# Patient Record
Sex: FEMALE | Race: WHITE | NOT HISPANIC OR LATINO | Employment: OTHER | URBAN - METROPOLITAN AREA
[De-identification: names, ages, dates, MRNs, and addresses within clinical notes are randomized per-mention and may not be internally consistent; named-entity substitution may affect disease eponyms.]

---

## 2017-12-30 ENCOUNTER — HOSPITAL ENCOUNTER (EMERGENCY)
Facility: HOSPITAL | Age: 52
Discharge: HOME | End: 2017-12-30
Attending: EMERGENCY MEDICINE
Payer: MEDICARE

## 2017-12-30 VITALS
TEMPERATURE: 98 F | HEART RATE: 74 BPM | SYSTOLIC BLOOD PRESSURE: 148 MMHG | DIASTOLIC BLOOD PRESSURE: 73 MMHG | RESPIRATION RATE: 16 BRPM

## 2017-12-30 DIAGNOSIS — M54.41 ACUTE BILATERAL LOW BACK PAIN WITH RIGHT-SIDED SCIATICA: Primary | ICD-10-CM

## 2017-12-30 PROCEDURE — 99283 EMERGENCY DEPT VISIT LOW MDM: CPT | Mod: 25

## 2017-12-30 PROCEDURE — 63600175 PHARM REV CODE 636 W HCPCS: Performed by: EMERGENCY MEDICINE

## 2017-12-30 PROCEDURE — 96372 THER/PROPH/DIAG INJ SC/IM: CPT

## 2017-12-30 PROCEDURE — 25000003 PHARM REV CODE 250: Performed by: EMERGENCY MEDICINE

## 2017-12-30 RX ORDER — KETOROLAC TROMETHAMINE 30 MG/ML
60 INJECTION, SOLUTION INTRAMUSCULAR; INTRAVENOUS
Status: COMPLETED | OUTPATIENT
Start: 2017-12-30 | End: 2017-12-30

## 2017-12-30 RX ORDER — DEXAMETHASONE SODIUM PHOSPHATE 4 MG/ML
12 INJECTION, SOLUTION INTRA-ARTICULAR; INTRALESIONAL; INTRAMUSCULAR; INTRAVENOUS; SOFT TISSUE
Status: COMPLETED | OUTPATIENT
Start: 2017-12-30 | End: 2017-12-30

## 2017-12-30 RX ORDER — KETOROLAC TROMETHAMINE 10 MG/1
10 TABLET, FILM COATED ORAL EVERY 6 HOURS
Qty: 20 TABLET | Refills: 0 | Status: SHIPPED | OUTPATIENT
Start: 2017-12-30 | End: 2018-02-08

## 2017-12-30 RX ORDER — METHOCARBAMOL 500 MG/1
1000 TABLET, FILM COATED ORAL 3 TIMES DAILY
Qty: 30 TABLET | Refills: 0 | Status: SHIPPED | OUTPATIENT
Start: 2017-12-30 | End: 2018-01-04

## 2017-12-30 RX ORDER — METHOCARBAMOL 500 MG/1
1000 TABLET, FILM COATED ORAL
Status: COMPLETED | OUTPATIENT
Start: 2017-12-30 | End: 2017-12-30

## 2017-12-30 RX ADMIN — KETOROLAC TROMETHAMINE 60 MG: 30 INJECTION, SOLUTION INTRAMUSCULAR at 04:12

## 2017-12-30 RX ADMIN — DEXAMETHASONE SODIUM PHOSPHATE 12 MG: 4 INJECTION, SOLUTION INTRAMUSCULAR; INTRAVENOUS at 04:12

## 2017-12-30 RX ADMIN — METHOCARBAMOL 1000 MG: 500 TABLET ORAL at 04:12

## 2017-12-30 NOTE — ED PROVIDER NOTES
[facility]                                                  Chief Complaint  52 y.o. female with Back Pain    History of Present Illness  Odilia Gonzalez presents to the emergency room with acute low back pain after picking up her grandchild.  She says she felt a pop.  She did not have any trauma such as a fall.  She denies neurologic symptoms.  She does report some pain into her upper right thigh.  Not had a history of chronic back pain.      No past medical history on file.  No past surgical history on file.   Review of patient's allergies indicates:  No Known Allergies     Review of Systems and Physical Exam     Review of Systems  -- Constitution - no fever, denies fatigue, no weakness, no chills  -- Eyes - no tearing or redness, no visual disturbance  -- Ear, Nose - no tinnitus or earache, no nasal congestion or discharge  -- Mouth,Throat - no sore throat, no toothache, normal voice, normal swallowing  -- Respiratory - denies cough and congestion, no shortness of breath, no wheezing  -- Cardiovascular - denies chest pain, no palpitations, denies claudication  -- Gastrointestinal - denies abdominal pain, nausea, vomiting, or diarrhea  -- Genitourinary - no dysuria, no denies flank pain, no hematuria or frequency   -- Musculoskeletal - reports low back pain which radiates to right thigh, negative for myalgias and arthralgias   -- Neurological - no headache, denies weakness or seizure; no LOC  -- Skin - denies pallor, rash, or changes in skin. no hives or welts noted    Vital Signs   oral temperature is 97.5 °F (36.4 °C). Her blood pressure is 148/73 (abnormal) and her pulse is 74. Her respiration is 16.      Physical Exam  -- Nursing note and vitals reviewed  -- Constitutional: Appears well-developed and well-nourished  -- Head: Atraumatic. Normocephalic. No obvious abnormality  -- Eyes: Pupils are equal and reactive to light. Normal conjunctiva and lids  -- Nose: Nose normal in appearance,  nares grossly normal. No discharge  -- Throat: Mucous membranes moist, pharynx normal, normal tonsils. No lesions   -- Ears: External ears and TM normal bilaterally. Normal hearing and no drainage  -- Neck: Normal range of motion. Neck supple. No masses, trachea midline  -- Cardiac: Normal rate, regular rhythm and normal heart sounds  -- Pulmonary: Normal respiratory effort, breath sounds clear to auscultation  -- Abdominal: Soft, no tenderness. Normal bowel sounds. Normal liver edge  -- Musculoskeletal: Normal range of motion, no effusions. Joints stable mild paraspinal lumbar pain with tenderness over the sciatic distribution.  Bilateral lower extremities neurovascularly intact  -- Neurological: No focal deficits. Showed good interaction with staff  -- Vascular: Posterior tibial, dorsalis pedis and radial pulses 2+ bilaterally    -- Lymphatics: No cervical or peripheral lymphadenopathy. No edema noted  -- Skin: Warm and dry. No evidence of rash or cellulitis  -- Psychiatric: Normal mood and affect. Bedside behavior is appropriate    Emergency Room Course     Treatment and Evaluation  1.  Physical exam unremarkable  2.  Lumbar spine x-ray negative for acute process  3.  Ketorolac 60 mg IM  4.  Decadron 12 IM  5.  Robaxin 1000 g by mouth  6.  DC home follow up PCP    Abnormal lab values  Labs Reviewed - No data to display    Medications Given  Medications   ketorolac injection 60 mg (60 mg Intramuscular Given 12/30/17 1602)   methocarbamol tablet 1,000 mg (1,000 mg Oral Given 12/30/17 1601)   dexamethasone injection 12 mg (12 mg Intramuscular Given 12/30/17 1603)           Diagnosis  -- Acute low back pain with sciatica    Disposition and Plan  -- Disposition: home  -- Condition: stable  -- Follow-up: Patient to follow up with Narinder Baird MD in 1-2 days.  -- I advised the patient that we have found no life threatening condition today  -- At this time, I believe the patient is clinically stable for discharge.    -- The patient acknowledges that close follow up with a MD is required   -- Patient agrees to comply with all instruction and direction given in the ER  -- Patient counseled on strict return precautions as discussed       Kelsey Boyd MD  12/30/17 1965

## 2017-12-30 NOTE — ED TRIAGE NOTES
Picked up grandchild on Thursday and felt pop in back and started hurting. Pain getting worse. Pain to mid and lower back on right side and rt. Leg.

## 2018-02-08 PROBLEM — E11.8 TYPE 2 DIABETES MELLITUS WITH COMPLICATION, WITHOUT LONG-TERM CURRENT USE OF INSULIN: Chronic | Status: ACTIVE | Noted: 2018-02-08

## 2018-02-08 PROBLEM — M19.90 ARTHRITIS: Status: ACTIVE | Noted: 2018-02-08

## 2018-02-08 PROBLEM — I25.10 CORONARY ARTERY DISEASE INVOLVING NATIVE CORONARY ARTERY OF NATIVE HEART WITHOUT ANGINA PECTORIS: Status: ACTIVE | Noted: 2018-02-08

## 2018-02-08 PROBLEM — F41.9 ANXIETY: Chronic | Status: ACTIVE | Noted: 2018-02-08

## 2018-02-08 PROBLEM — K21.9 GASTROESOPHAGEAL REFLUX DISEASE WITHOUT ESOPHAGITIS: Chronic | Status: ACTIVE | Noted: 2018-02-08

## 2018-02-08 PROBLEM — I10 ESSENTIAL HYPERTENSION: Chronic | Status: ACTIVE | Noted: 2018-02-08

## 2018-02-08 PROBLEM — J44.9 COPD (CHRONIC OBSTRUCTIVE PULMONARY DISEASE): Chronic | Status: ACTIVE | Noted: 2018-02-08

## 2018-02-08 PROBLEM — I10 ESSENTIAL HYPERTENSION: Status: ACTIVE | Noted: 2018-02-08

## 2018-02-08 PROBLEM — M51.16 LUMBAR DISC DISEASE WITH RADICULOPATHY: Status: ACTIVE | Noted: 2018-02-08

## 2018-02-08 PROBLEM — M51.16 LUMBAR DISC DISEASE WITH RADICULOPATHY: Chronic | Status: ACTIVE | Noted: 2018-02-08

## 2018-02-08 PROBLEM — J44.9 COPD (CHRONIC OBSTRUCTIVE PULMONARY DISEASE): Status: ACTIVE | Noted: 2018-02-08

## 2018-02-08 PROBLEM — F41.9 ANXIETY: Status: ACTIVE | Noted: 2018-02-08

## 2018-02-08 PROBLEM — E11.8 TYPE 2 DIABETES MELLITUS WITH COMPLICATION, WITHOUT LONG-TERM CURRENT USE OF INSULIN: Status: ACTIVE | Noted: 2018-02-08

## 2018-02-08 PROBLEM — I25.10 CORONARY ARTERY DISEASE INVOLVING NATIVE CORONARY ARTERY OF NATIVE HEART WITHOUT ANGINA PECTORIS: Chronic | Status: ACTIVE | Noted: 2018-02-08

## 2018-02-08 PROBLEM — M19.90 ARTHRITIS: Chronic | Status: ACTIVE | Noted: 2018-02-08

## 2018-02-08 PROBLEM — K21.9 GASTROESOPHAGEAL REFLUX DISEASE WITHOUT ESOPHAGITIS: Status: ACTIVE | Noted: 2018-02-08

## 2018-06-13 PROBLEM — I20.9 ANGINA, CLASS III: Status: ACTIVE | Noted: 2018-06-13

## 2018-08-02 PROBLEM — N18.30 CKD STAGE 3 SECONDARY TO DIABETES: Chronic | Status: ACTIVE | Noted: 2018-08-02

## 2018-08-02 PROBLEM — E11.22 CKD STAGE 3 SECONDARY TO DIABETES: Chronic | Status: ACTIVE | Noted: 2018-08-02

## 2018-08-30 PROBLEM — E03.4 HYPOTHYROIDISM DUE TO ACQUIRED ATROPHY OF THYROID: Chronic | Status: ACTIVE | Noted: 2018-08-30

## 2018-12-05 PROBLEM — M50.90 CERVICAL NECK PAIN WITH EVIDENCE OF DISC DISEASE: Chronic | Status: ACTIVE | Noted: 2018-12-05

## 2019-07-03 PROBLEM — E03.4 HYPOTHYROIDISM DUE TO ACQUIRED ATROPHY OF THYROID: Status: ACTIVE | Noted: 2018-08-30

## 2019-07-03 PROBLEM — R07.9 CHEST PAIN WITH HIGH RISK OF ACUTE CORONARY SYNDROME: Status: ACTIVE | Noted: 2019-07-03

## 2019-07-04 PROBLEM — R63.8 ALTERATION IN NUTRITION: Status: ACTIVE | Noted: 2019-07-04

## 2019-07-10 PROBLEM — K31.84 GASTROPARESIS DUE TO DM: Chronic | Status: ACTIVE | Noted: 2019-07-10

## 2019-07-10 PROBLEM — E11.43 GASTROPARESIS DUE TO DM: Chronic | Status: ACTIVE | Noted: 2019-07-10

## 2019-09-19 PROBLEM — J41.0 SIMPLE CHRONIC BRONCHITIS: Status: ACTIVE | Noted: 2018-02-08

## 2019-09-27 PROBLEM — J41.0 SIMPLE CHRONIC BRONCHITIS: Chronic | Status: ACTIVE | Noted: 2018-02-08

## 2019-10-03 PROBLEM — E66.01 CLASS 3 SEVERE OBESITY DUE TO EXCESS CALORIES WITH SERIOUS COMORBIDITY AND BODY MASS INDEX (BMI) OF 45.0 TO 49.9 IN ADULT: Chronic | Status: ACTIVE | Noted: 2019-10-03

## 2020-02-03 PROBLEM — R60.0 PEDAL EDEMA: Status: ACTIVE | Noted: 2020-02-03

## 2020-02-16 PROBLEM — R63.8 ALTERATION IN NUTRITION: Status: RESOLVED | Noted: 2019-07-04 | Resolved: 2020-02-16

## 2020-09-28 PROBLEM — E66.01 CLASS 3 SEVERE OBESITY DUE TO EXCESS CALORIES WITH SERIOUS COMORBIDITY AND BODY MASS INDEX (BMI) OF 50.0 TO 59.9 IN ADULT: Status: ACTIVE | Noted: 2019-10-03

## 2020-12-04 PROBLEM — G56.03 BILATERAL CARPAL TUNNEL SYNDROME: Status: ACTIVE | Noted: 2020-12-04

## 2021-02-24 PROBLEM — R19.4 CHANGE IN BOWEL HABIT: Status: ACTIVE | Noted: 2021-02-24

## 2021-03-26 PROBLEM — Z79.4 TYPE 2 DIABETES MELLITUS WITH DIABETIC POLYNEUROPATHY, WITH LONG-TERM CURRENT USE OF INSULIN: Status: ACTIVE | Noted: 2018-02-08

## 2021-03-26 PROBLEM — E78.5 HYPERLIPIDEMIA ASSOCIATED WITH TYPE 2 DIABETES MELLITUS: Status: ACTIVE | Noted: 2021-03-26

## 2021-03-26 PROBLEM — E11.42 TYPE 2 DIABETES MELLITUS WITH DIABETIC POLYNEUROPATHY, WITH LONG-TERM CURRENT USE OF INSULIN: Status: ACTIVE | Noted: 2018-02-08

## 2021-03-26 PROBLEM — E11.69 HYPERLIPIDEMIA ASSOCIATED WITH TYPE 2 DIABETES MELLITUS: Status: ACTIVE | Noted: 2021-03-26

## 2021-10-18 PROBLEM — Z71.3 DIETARY COUNSELING: Chronic | Status: ACTIVE | Noted: 2021-10-18

## 2021-10-18 PROBLEM — Z71.3 DIETARY COUNSELING: Status: ACTIVE | Noted: 2021-10-18

## 2021-10-18 PROBLEM — R60.0 PEDAL EDEMA: Status: RESOLVED | Noted: 2020-02-03 | Resolved: 2021-10-18

## 2021-10-18 PROBLEM — E11.69 HYPERLIPIDEMIA ASSOCIATED WITH TYPE 2 DIABETES MELLITUS: Chronic | Status: ACTIVE | Noted: 2021-03-26

## 2021-10-18 PROBLEM — I20.9 ANGINA, CLASS III: Status: RESOLVED | Noted: 2018-06-13 | Resolved: 2021-10-18

## 2021-10-18 PROBLEM — Z51.81 THERAPEUTIC DRUG MONITORING: Status: ACTIVE | Noted: 2021-10-18

## 2021-10-18 PROBLEM — M19.90 ARTHRITIS: Chronic | Status: RESOLVED | Noted: 2018-02-08 | Resolved: 2021-10-18

## 2021-10-18 PROBLEM — R07.9 CHEST PAIN WITH HIGH RISK OF ACUTE CORONARY SYNDROME: Status: RESOLVED | Noted: 2019-07-03 | Resolved: 2021-10-18

## 2021-10-18 PROBLEM — Z79.4 TYPE 2 DIABETES MELLITUS WITH DIABETIC POLYNEUROPATHY, WITH LONG-TERM CURRENT USE OF INSULIN: Chronic | Status: ACTIVE | Noted: 2018-02-08

## 2021-10-18 PROBLEM — F11.20 UNCOMPLICATED OPIOID DEPENDENCE: Chronic | Status: ACTIVE | Noted: 2021-10-18

## 2021-10-18 PROBLEM — Z51.81 THERAPEUTIC DRUG MONITORING: Chronic | Status: ACTIVE | Noted: 2021-10-18

## 2021-10-18 PROBLEM — F13.20 BENZODIAZEPINE DEPENDENCE: Chronic | Status: ACTIVE | Noted: 2021-10-18

## 2021-10-18 PROBLEM — E11.42 TYPE 2 DIABETES MELLITUS WITH DIABETIC POLYNEUROPATHY, WITH LONG-TERM CURRENT USE OF INSULIN: Chronic | Status: ACTIVE | Noted: 2018-02-08

## 2021-10-18 PROBLEM — E78.5 HYPERLIPIDEMIA ASSOCIATED WITH TYPE 2 DIABETES MELLITUS: Chronic | Status: ACTIVE | Noted: 2021-03-26

## 2021-10-18 PROBLEM — E89.0 POSTOPERATIVE HYPOTHYROIDISM: Chronic | Status: ACTIVE | Noted: 2018-08-30

## 2021-10-18 PROBLEM — E55.9 VITAMIN D DEFICIENCY: Chronic | Status: ACTIVE | Noted: 2021-10-18

## 2021-10-18 PROBLEM — R19.4 CHANGE IN BOWEL HABIT: Status: RESOLVED | Noted: 2021-02-24 | Resolved: 2021-10-18

## 2022-02-10 PROBLEM — F31.32 BIPOLAR AFFECTIVE DISORDER, CURRENTLY DEPRESSED, MODERATE: Chronic | Status: ACTIVE | Noted: 2022-02-10

## 2022-02-10 PROBLEM — F31.32 BIPOLAR AFFECTIVE DISORDER, CURRENTLY DEPRESSED, MODERATE: Status: ACTIVE | Noted: 2022-02-10

## 2022-05-02 PROBLEM — R07.89 CHEST DISCOMFORT: Status: ACTIVE | Noted: 2022-05-02

## 2022-05-03 PROBLEM — Z71.3 DIETARY COUNSELING: Chronic | Status: RESOLVED | Noted: 2021-10-18 | Resolved: 2022-05-03

## 2022-05-03 PROBLEM — Z51.81 THERAPEUTIC DRUG MONITORING: Chronic | Status: RESOLVED | Noted: 2021-10-18 | Resolved: 2022-05-03

## 2022-05-03 PROBLEM — R07.89 CHEST DISCOMFORT: Status: RESOLVED | Noted: 2022-05-02 | Resolved: 2022-05-03

## 2022-08-09 PROBLEM — M54.17 LUMBOSACRAL RADICULOPATHY AT L5: Status: ACTIVE | Noted: 2022-08-09

## 2022-08-09 PROBLEM — R80.9 NEPHROTIC RANGE PROTEINURIA: Status: ACTIVE | Noted: 2022-08-09

## 2023-01-10 PROBLEM — M48.062 SPINAL STENOSIS, LUMBAR REGION WITH NEUROGENIC CLAUDICATION: Status: ACTIVE | Noted: 2023-01-10

## 2023-01-30 PROBLEM — R10.9 ABDOMINAL PAIN: Status: ACTIVE | Noted: 2023-01-30

## 2023-03-14 PROBLEM — R07.9 CHEST PAIN: Status: RESOLVED | Noted: 2019-07-03 | Resolved: 2023-03-14

## 2023-03-23 PROBLEM — R52 INTRACTABLE PAIN: Status: ACTIVE | Noted: 2023-03-23

## 2023-06-02 PROBLEM — E66.09 CLASS 1 OBESITY DUE TO EXCESS CALORIES WITH SERIOUS COMORBIDITY AND BODY MASS INDEX (BMI) OF 34.0 TO 34.9 IN ADULT: Chronic | Status: ACTIVE | Noted: 2019-10-03

## 2023-12-10 PROBLEM — N17.9 AKI (ACUTE KIDNEY INJURY): Status: ACTIVE | Noted: 2023-12-10

## 2023-12-10 PROBLEM — E11.10 DIABETIC KETOACIDOSIS ASSOCIATED WITH TYPE 2 DIABETES MELLITUS: Status: ACTIVE | Noted: 2023-12-10

## 2023-12-11 PROBLEM — D72.829 LEUKOCYTOSIS: Status: ACTIVE | Noted: 2023-12-11

## 2023-12-11 PROBLEM — N17.9 AKI (ACUTE KIDNEY INJURY): Status: ACTIVE | Noted: 2023-12-11

## 2023-12-11 PROBLEM — I21.4 NSTEMI (NON-ST ELEVATED MYOCARDIAL INFARCTION): Status: ACTIVE | Noted: 2023-12-11

## 2023-12-11 PROBLEM — Z79.899 POLYPHARMACY: Status: ACTIVE | Noted: 2023-12-11

## 2023-12-11 PROBLEM — E66.09 CLASS 1 OBESITY DUE TO EXCESS CALORIES WITH SERIOUS COMORBIDITY AND BODY MASS INDEX (BMI) OF 32.0 TO 32.9 IN ADULT: Status: ACTIVE | Noted: 2019-10-03

## 2023-12-11 PROBLEM — D64.9 NORMOCYTIC ANEMIA: Status: ACTIVE | Noted: 2023-12-11

## 2023-12-11 PROBLEM — R74.01 TRANSAMINASEMIA: Status: ACTIVE | Noted: 2023-12-11

## 2023-12-11 PROBLEM — E80.6 HYPERBILIRUBINEMIA: Status: ACTIVE | Noted: 2023-12-11

## 2023-12-11 PROBLEM — G93.41 ACUTE METABOLIC ENCEPHALOPATHY: Status: ACTIVE | Noted: 2023-12-11

## 2023-12-11 PROBLEM — N18.4 CKD (CHRONIC KIDNEY DISEASE), STAGE IV: Status: ACTIVE | Noted: 2023-12-11

## 2023-12-11 PROBLEM — E87.20 LACTIC ACIDEMIA: Status: ACTIVE | Noted: 2023-12-11

## 2023-12-11 PROBLEM — N17.0 ATN (ACUTE TUBULAR NECROSIS): Status: ACTIVE | Noted: 2023-12-11

## 2023-12-12 PROBLEM — A41.9 SEPSIS: Status: ACTIVE | Noted: 2023-12-12

## 2023-12-12 PROBLEM — T38.3X6A: Status: ACTIVE | Noted: 2023-12-12

## 2023-12-12 PROBLEM — T85.624A: Status: ACTIVE | Noted: 2023-12-12

## 2023-12-14 PROBLEM — I48.0 PAROXYSMAL ATRIAL FIBRILLATION: Status: ACTIVE | Noted: 2023-12-14

## 2024-03-11 PROBLEM — I21.4 NSTEMI (NON-ST ELEVATED MYOCARDIAL INFARCTION): Status: RESOLVED | Noted: 2023-12-11 | Resolved: 2024-03-11

## 2024-03-11 PROBLEM — E11.10 DIABETIC KETOACIDOSIS ASSOCIATED WITH TYPE 2 DIABETES MELLITUS: Status: RESOLVED | Noted: 2023-12-10 | Resolved: 2024-03-11

## 2024-03-11 PROBLEM — N17.9 AKI (ACUTE KIDNEY INJURY): Status: RESOLVED | Noted: 2023-12-11 | Resolved: 2024-03-11

## 2024-03-18 PROBLEM — A41.9 SEPSIS: Status: RESOLVED | Noted: 2023-12-12 | Resolved: 2024-03-18
